# Patient Record
Sex: MALE | Race: BLACK OR AFRICAN AMERICAN | NOT HISPANIC OR LATINO | Employment: STUDENT | URBAN - METROPOLITAN AREA
[De-identification: names, ages, dates, MRNs, and addresses within clinical notes are randomized per-mention and may not be internally consistent; named-entity substitution may affect disease eponyms.]

---
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
## 2025-01-15 ENCOUNTER — TELEPHONE (OUTPATIENT)
Age: 20
End: 2025-01-15

## 2025-04-28 ENCOUNTER — TELEPHONE (OUTPATIENT)
Age: 20
End: 2025-04-28

## 2025-04-28 NOTE — LETTER
[address]  [facility] [address]  [facility] [address]  Phone#  [phone]  Fax#  [phone]      April 29, 2025      Dear:   Michael Wlison         Our office has attempted to contact you several times regarding your Appointment.  Could you please contact our office at [phone].    Thank you.     Sincerely,    [facility]

## 2025-04-29 NOTE — TELEPHONE ENCOUNTER
A letter has been generated and sent to the address listed on file asking patient to call the office to schedule an appointment.